# Patient Record
Sex: MALE | Race: WHITE
[De-identification: names, ages, dates, MRNs, and addresses within clinical notes are randomized per-mention and may not be internally consistent; named-entity substitution may affect disease eponyms.]

---

## 2019-01-14 ENCOUNTER — HOSPITAL ENCOUNTER (EMERGENCY)
Dept: HOSPITAL 10 - FTE | Age: 59
Discharge: HOME | End: 2019-01-14
Payer: COMMERCIAL

## 2019-01-14 VITALS
WEIGHT: 139.11 LBS | HEIGHT: 69 IN | BODY MASS INDEX: 20.6 KG/M2 | HEIGHT: 69 IN | WEIGHT: 139.11 LBS | BODY MASS INDEX: 20.6 KG/M2

## 2019-01-14 VITALS — HEART RATE: 102 BPM | SYSTOLIC BLOOD PRESSURE: 143 MMHG | DIASTOLIC BLOOD PRESSURE: 85 MMHG | RESPIRATION RATE: 18 BRPM

## 2019-01-14 DIAGNOSIS — Z85.47: ICD-10-CM

## 2019-01-14 DIAGNOSIS — Z85.048: ICD-10-CM

## 2019-01-14 DIAGNOSIS — I10: ICD-10-CM

## 2019-01-14 DIAGNOSIS — Z87.891: ICD-10-CM

## 2019-01-14 DIAGNOSIS — F20.9: Primary | ICD-10-CM

## 2019-01-14 DIAGNOSIS — Z85.118: ICD-10-CM

## 2019-01-14 PROCEDURE — 96372 THER/PROPH/DIAG INJ SC/IM: CPT

## 2019-01-15 NOTE — ERD
ER Documentation


Chief Complaint


Chief Complaint





GENERALIZED BODY PAIN, NO KNKOWN INJ; ON/OFF FOR "AWHILE"





HPI


58-year-old male presents for generalized body pain times 1 week.  Patient does 


have a past medical history of testicular cancer, psych history, lung cancer.  


Patient states that he has groin pain.  He states that he has a "blown nut".  On


further questioning patient states that he has a history of left testicular 


cancer while he was in residential a long time ago.  Patient also states that he has 


lung cancer and had prior treatment with the [facility].  Patient is a poor 


historian.  No other complaints.  Denies suicidal or homicidal ideation.





ROS


All systems reviewed and are negative except as per history of present illness.





Medications


Home Meds


Active Scripts


Hydrocodone Bit-Acetaminophen* (Vicodin*) 5-300 Tab, 1 TAB PO Q6H PRN for PAIN, 


#10 TAB


   Prov:KEILY ANDERSON DO         1/14/19


Olanzapine* (Zyprexa*) 5 Mg Tablet, 5 MG PO DAILY for schizophrenia, #30 TAB


   Prov:KEILY ANDERSON DO         1/14/19


Cyclobenzaprine Hcl* (Cyclobenzaprine Hcl*) 10 Mg Tablet, 10 MG PO TID PRN for 


MUSCLE SPASMS, #30 TAB


   Prov:KEILY ANDERSON DO         1/14/19


Ibuprofen* (Motrin*) 600 Mg Tab, 600 MG PO Q6H PRN for PAIN, #30 TAB


   Prov:KEILY ANDERSON DO         1/14/19


Ibuprofen* (Ibuprofen*) 600 Mg Tablet, 600 MG PO Q6H PRN for PAIN, #30 TAB


   Prov:ANT WEEKS MD         7/21/16


Olanzapine* (Zyprexa* Zydis) 5 Mg Tab, 5 MG PO DAILY, #14 TAB


   Prov:ANT WEEKS MD         7/21/16


Olanzapine* (Zyprexa*) 5 Mg Tablet, 5 MG PO DAILY, #6 TAB


   Prov:JADEN ESPANA DO         7/11/16


Risperidone* (Risperdal*) 1 Mg Tablet, 1 MG PO BID, #30 TAB


   Prov:BRONWYN TAYLOR         1/5/15





Allergies


Allergies:  


Coded Allergies:  


     pseudoephedrine (Verified  Allergy, Severe, 7/21/16)





PMhx/Soc


History of Surgery:  Yes (PATIENT STATED MANY SURGERY BUT UNABLE TO SAY )


Anesthesia Reaction:  No


Hx Neurological Disorder:  No


Hx Respiratory Disorders:  Yes (LUNG CA, BRONCHITIS, emphysema)


Hx Cardiac Disorders:  Yes (HTN)


Hx Psychiatric Problems:  Yes


Hx Miscellaneous Medical Probl:  Yes (rectal/testicular/lung CA)


Hx Alcohol Use:  Yes


Hx Substance Use:  Yes (MARIJUANA)


Hx Tobacco Use:  Yes


Smoking Status:  Never smoker





Physical Exam


Vitals


Vital Signs


  Date      Temp  Pulse  Resp  B/P (MAP)   Pulse Ox  O2          O2 Flow    FiO2


Time                                                 Delivery    Rate


   1/14/19  98.4    102    18      143/85        96  Room Air


     23:04                          (104)


   1/14/19  97.1    107    18      137/74        99


     19:11                           (95)





Physical Exam


Const:   No acute distress, patient is a poor historian.


Resp:   Clear to auscultation bilaterally


Cardio:   Regular rate and rhythm, no murmurs


Abd:    Soft, non tender, non distended. Normal bowel sounds


Skin:   No petechiae or rashes


Back:   No midline or flank tenderness


Ext:    No cyanosis, or edema


Neur:   Awake and alert


Psych:    Normal Mood and Affect


Patient refused genital exam.


Results 24 hrs





Current Medications


 Medications
   Dose
          Sig/Jose Francisco
       Start Time
   Status  Last


 (Trade)       Ordered        Route
 PRN     Stop Time              Admin
Dose


                              Reason                                Admin


 Ketorolac
     30 mg          ONCE  STAT
    1/14/19       DC           1/14/19


Tromethamine
                 IM
            21:23
                       21:36



 (Toradol)                                   1/14/19 21:24


                10 mg          ONCE  ONCE
    1/14/19       DC           1/14/19


Cyclobenzapri                 PO
            21:30
                       21:36



ne
 HCl
                                     1/14/19 21:31


(Flexeril)








Procedures/MDM


Medical Decision Making:





Differential diagnosis includes but not limited to exacerbation of psych 


disorder, electrolyte abnormalities, testicular cancer, lung cancer.





Patient appeared well on physical exam.  Patient was however a poor historian.


Discussed with patient that an extensive workup may be needed given his reported


history of testicular cancer, lung cancer, groin pain.


Patient states that he did not want any blood work done or imaging done.  He 


only wanted medication for pain.





Patient given Flexeril and Toradol in the ER.


Patient symptoms did improve.





Patient was given prescription for Norco short course low-dose, Flexeril, 


Zyprexa refill.





At this point patient's generalized body pain is nonspecific.


Given that patient refused lab work and imaging, patient encouraged to follow-up


with his primary care physician.





Patient advised to follow up with PCP in 1-2 days. Patient advised to return to 


ED for new or worsening symptoms. Patient stable on discharge from the ED.





The patient has been prescribed Norco during this encounter.





The patient has been warned about the use of narcotics. The patient should not 


drive or operate heavy machinery while taking this medication. The patient was 


also warned about the addictive properties of narcotic medications. 





[Narcan prescription was NOT provided given the following criteria





1. No more than 5 tablets of Norco 10 mg or 10 tablets of Norco 5 mg were pres


cribed.





2. Concomitant opiate and benzodiazepine prescriptions were not provided.





3. There is no obvious evidence of prior history of opiate abuse or overdose.]











Disclaimer: Inadvertent spelling and grammatical errors are likely due to 


EHR/dictation software use and do not reflect on the overall quality of patient 


care. Also, please note that the electronic time recorded on this note does not 


necessarily reflect the actual time of the patient encounter.





Departure


Diagnosis:  


   Primary Impression:  


   Generalized pain


   Additional Impression:  


   Schizophrenia


   Schizophrenia type:  unspecified  Qualified Codes:  F20.9 - Schizophrenia, 


   unspecified


Condition:  Fair


Patient Instructions:  Measuring Your Pain


Referrals:  


Critical access hospital CLINICS


YOU HAVE RECEIVED A MEDICAL SCREENING EXAM AND THE RESULTS INDICATE THAT YOU DO 


NOT HAVE A CONDITION THAT REQUIRES URGENT TREATMENT IN THE EMERGENCY DEPARTMENT.





FURTHER EVALUATION AND TREATMENT OF YOUR CONDITION CAN WAIT UNTIL YOU ARE SEEN 


IN YOUR DOCTORS OFFICE WITHIN THE NEXT 1-2 DAYS. IT IS YOUR RESPONSIBILITY TO 


MAKE AN APPOINTMENT FOR FOLOW-UP CARE.





IF YOU HAVE A PRIMARY DOCTOR


--you should call your primary doctor and schedule an appointment





IF YOU DO NOT HAVE A PRIMARY DOCTOR YOU CAN CALL OUR PHYSICIAN REFERRAL HOTLINE 


AT


 [phone] 





IF YOU CAN NOT AFFORD TO SEE A PHYSICIAN YOU CAN CHOSE FROM THE FOLLOWING 


Critical access hospital CLINICS





[facility] [phone] [address]. [facility] [phone] [address] [facility]. [facility] [phone] [address] [facility]. [facility] [phone] [address] [facility]. [facility] [phone] [address] [facility]. [facility]. [phone]


[address]





Additional Instructions:  


Call your primary care doctor TOMORROW for an appointment during the next 1-2 d


ays.See the doctor sooner or return here if your condition worsens before your 


appointment time.











KEILY ANDERSON DO                 Jassi 15, 2019 07:01

## 2019-02-02 ENCOUNTER — HOSPITAL ENCOUNTER (EMERGENCY)
Dept: HOSPITAL 87 - ER | Age: 59
LOS: 1 days | Discharge: HOME | End: 2019-02-03
Payer: MEDICAID

## 2019-02-02 VITALS — WEIGHT: 169.76 LBS | BODY MASS INDEX: 25.14 KG/M2 | HEIGHT: 69 IN
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
## 2019-02-02 DIAGNOSIS — N50.819: ICD-10-CM

## 2019-02-02 DIAGNOSIS — S90.921A: ICD-10-CM

## 2019-02-02 DIAGNOSIS — Z88.8: ICD-10-CM
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
## 2019-02-02 DIAGNOSIS — S90.922A: Primary | ICD-10-CM
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
## 2019-02-02 DIAGNOSIS — F12.10: ICD-10-CM
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
## 2019-02-02 DIAGNOSIS — F17.200: ICD-10-CM

## 2019-02-02 DIAGNOSIS — Z90.89: ICD-10-CM

## 2019-02-02 LAB
CHLORIDE SERPL-SCNC: 104 MEQ/L (ref 98–107)
INR PPP: 1
PROTHROMBIN TIME: 9.8 SEC (ref 9.1–11.1)

## 2019-02-02 PROCEDURE — 85025 COMPLETE CBC W/AUTO DIFF WBC: CPT

## 2019-02-02 PROCEDURE — 96374 THER/PROPH/DIAG INJ IV PUSH: CPT

## 2019-02-02 PROCEDURE — 81003 URINALYSIS AUTO W/O SCOPE: CPT

## 2019-02-02 PROCEDURE — 80053 COMPREHEN METABOLIC PANEL: CPT

## 2019-02-02 PROCEDURE — 36415 COLL VENOUS BLD VENIPUNCTURE: CPT

## 2019-02-02 PROCEDURE — 73630 X-RAY EXAM OF FOOT: CPT

## 2019-02-02 PROCEDURE — 85610 PROTHROMBIN TIME: CPT

## 2019-02-02 PROCEDURE — 93976 VASCULAR STUDY: CPT

## 2019-02-02 PROCEDURE — 99284 EMERGENCY DEPT VISIT MOD MDM: CPT

## 2019-02-02 PROCEDURE — 76870 US EXAM SCROTUM: CPT

## 2019-02-03 VITALS — DIASTOLIC BLOOD PRESSURE: 85 MMHG | SYSTOLIC BLOOD PRESSURE: 129 MMHG

## 2019-02-03 LAB
APPEARANCE UR: CLEAR
BASOPHILS NFR BLD AUTO: 0.7 % (ref 0–2)
COLOR UR: YELLOW
EOSINOPHIL NFR BLD AUTO: 5.1 % (ref 0–5)
ERYTHROCYTE [DISTWIDTH] IN BLOOD BY AUTOMATED COUNT: 15.4 % (ref 11.6–14.6)
HCT VFR BLD AUTO: 43.4 % (ref 42–52)
HGB BLD-MCNC: 14.4 G/DL (ref 14–18)
HGB UR QL STRIP: NEGATIVE
KETONES UR STRIP-MCNC: NEGATIVE MG/DL
LEUKOCYTE ESTERASE UR QL STRIP: NEGATIVE
LYMPHOCYTES NFR BLD AUTO: 29.1 % (ref 20–50)
MCH RBC QN AUTO: 33.1 PG (ref 28–32)
MCV RBC AUTO: 99.6 FL (ref 80–94)
MONOCYTES NFR BLD AUTO: 12.2 % (ref 2–8)
NEUTROPHILS NFR BLD AUTO: 52.9 % (ref 40–76)
NITRITE UR QL STRIP: NEGATIVE
PH UR STRIP: 6.5 [PH] (ref 4.5–8)
PLATELET # BLD AUTO: 340 X1000/UL (ref 130–400)
PMV BLD AUTO: 6.9 FL (ref 7.4–10.4)
PROT UR QL STRIP: NEGATIVE
RBC # BLD AUTO: 4.36 MILL/UL (ref 4.7–6.1)
SP GR UR STRIP: 1.01 (ref 1–1.03)
UROBILINOGEN UR STRIP-MCNC: 0.2 E.U./DL (ref 0.2–1)

## 2021-12-28 ENCOUNTER — HOSPITAL ENCOUNTER (INPATIENT)
Dept: HOSPITAL 12 - ER | Age: 61
LOS: 3 days | Discharge: NURSING, REHAB OR LONG-TERM CARE | DRG: 252 | End: 2021-12-31
Attending: INTERNAL MEDICINE | Admitting: INTERNAL MEDICINE
Payer: COMMERCIAL

## 2021-12-28 VITALS — SYSTOLIC BLOOD PRESSURE: 99 MMHG | DIASTOLIC BLOOD PRESSURE: 71 MMHG

## 2021-12-28 VITALS — WEIGHT: 130 LBS | HEIGHT: 68 IN | BODY MASS INDEX: 19.7 KG/M2

## 2021-12-28 DIAGNOSIS — G93.49: ICD-10-CM

## 2021-12-28 DIAGNOSIS — K56.41: ICD-10-CM

## 2021-12-28 DIAGNOSIS — R91.8: ICD-10-CM

## 2021-12-28 DIAGNOSIS — R13.10: ICD-10-CM

## 2021-12-28 DIAGNOSIS — K94.23: Primary | ICD-10-CM

## 2021-12-28 DIAGNOSIS — E53.8: ICD-10-CM

## 2021-12-28 DIAGNOSIS — Z87.891: ICD-10-CM

## 2021-12-28 DIAGNOSIS — N40.0: ICD-10-CM

## 2021-12-28 DIAGNOSIS — E78.5: ICD-10-CM

## 2021-12-28 DIAGNOSIS — K94.22: ICD-10-CM
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
## 2021-12-28 DIAGNOSIS — K59.00: ICD-10-CM

## 2021-12-28 DIAGNOSIS — K76.89: ICD-10-CM

## 2021-12-28 DIAGNOSIS — D75.89: ICD-10-CM

## 2021-12-28 DIAGNOSIS — Z86.16: ICD-10-CM

## 2021-12-28 DIAGNOSIS — K80.20: ICD-10-CM
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
## 2021-12-28 DIAGNOSIS — F03.90: ICD-10-CM

## 2021-12-28 DIAGNOSIS — Z85.47: ICD-10-CM

## 2021-12-28 DIAGNOSIS — L03.311: ICD-10-CM

## 2021-12-28 DIAGNOSIS — Z20.822: ICD-10-CM

## 2021-12-28 DIAGNOSIS — M51.36: ICD-10-CM

## 2021-12-28 DIAGNOSIS — F25.9: ICD-10-CM

## 2021-12-28 DIAGNOSIS — G62.9: ICD-10-CM

## 2021-12-28 LAB
ALP SERPL-CCNC: 62 U/L (ref 50–136)
ALT SERPL W/O P-5'-P-CCNC: 32 U/L (ref 16–63)
AST SERPL-CCNC: 41 U/L (ref 15–37)
BILIRUB DIRECT SERPL-MCNC: 0.1 MG/DL (ref 0–0.2)
BILIRUB SERPL-MCNC: 0.8 MG/DL (ref 0.2–1)
BUN SERPL-MCNC: 17 MG/DL (ref 7–18)
CHLORIDE SERPL-SCNC: 105 MMOL/L (ref 98–107)
CO2 SERPL-SCNC: 25 MMOL/L (ref 21–32)
CREAT SERPL-MCNC: 0.7 MG/DL (ref 0.6–1.3)
GLUCOSE SERPL-MCNC: 100 MG/DL (ref 74–106)
HCT VFR BLD AUTO: 48.3 % (ref 36.7–47.1)
LIPASE SERPL-CCNC: 127 U/L (ref 73–393)
MCH RBC QN AUTO: 34 UUG (ref 23.8–33.4)
MCV RBC AUTO: 97.5 FL (ref 73–96.2)
PLATELET # BLD AUTO: 186 K/UL (ref 152–348)
POTASSIUM SERPL-SCNC: 4.8 MMOL/L (ref 3.5–5.1)
WS STN SPEC: 8.1 G/DL (ref 6.4–8.2)

## 2021-12-28 PROCEDURE — G0378 HOSPITAL OBSERVATION PER HR: HCPCS

## 2021-12-28 PROCEDURE — A4663 DIALYSIS BLOOD PRESSURE CUFF: HCPCS

## 2021-12-28 PROCEDURE — 05H933Z INSERTION OF INFUSION DEVICE INTO RIGHT BRACHIAL VEIN, PERCUTANEOUS APPROACH: ICD-10-PCS

## 2021-12-28 RX ADMIN — SENNOSIDES SCH TAB: 8.6 TABLET, COATED ORAL at 21:00

## 2021-12-28 NOTE — NUR
Pt trans to room 314, NAD noted. Pt to have CT chest/abd/pelvis with IV 
contrast after midline placement, reported to BRIAN Roach accordingly.

## 2021-12-28 NOTE — NUR
Per CT tech pt's saline lock got infiltrated during CT. Unable to establish 
another saline lock at this time,  notified.

## 2021-12-28 NOTE — NUR
Per  pt to be admitted to m/s, [facility] paged, no m/s beds available at 
this time per m/s floor.

## 2021-12-29 VITALS — DIASTOLIC BLOOD PRESSURE: 75 MMHG | SYSTOLIC BLOOD PRESSURE: 107 MMHG

## 2021-12-29 VITALS — SYSTOLIC BLOOD PRESSURE: 106 MMHG | DIASTOLIC BLOOD PRESSURE: 64 MMHG

## 2021-12-29 VITALS — DIASTOLIC BLOOD PRESSURE: 69 MMHG | SYSTOLIC BLOOD PRESSURE: 113 MMHG

## 2021-12-29 VITALS — SYSTOLIC BLOOD PRESSURE: 110 MMHG | DIASTOLIC BLOOD PRESSURE: 62 MMHG

## 2021-12-29 LAB
ALP SERPL-CCNC: 62 U/L (ref 50–136)
ALT SERPL W/O P-5'-P-CCNC: 38 U/L (ref 16–63)
AST SERPL-CCNC: 18 U/L (ref 15–37)
BILIRUB SERPL-MCNC: 0.8 MG/DL (ref 0.2–1)
BUN SERPL-MCNC: 17 MG/DL (ref 7–18)
CHLORIDE SERPL-SCNC: 105 MMOL/L (ref 98–107)
CHOLEST SERPL-MCNC: 154 MG/DL (ref ?–200)
CO2 SERPL-SCNC: 24 MMOL/L (ref 21–32)
CREAT SERPL-MCNC: 0.7 MG/DL (ref 0.6–1.3)
GLUCOSE SERPL-MCNC: 88 MG/DL (ref 74–106)
HCT VFR BLD AUTO: 45.9 % (ref 36.7–47.1)
HDLC SERPL-MCNC: 39 MG/DL (ref 40–60)
MAGNESIUM SERPL-MCNC: 2.2 MG/DL (ref 1.8–2.4)
MCH RBC QN AUTO: 33.2 UUG (ref 23.8–33.4)
MCV RBC AUTO: 96.6 FL (ref 73–96.2)
PHOSPHATE SERPL-MCNC: 3.9 MG/DL (ref 2.5–4.9)
PLATELET # BLD AUTO: 293 K/UL (ref 152–348)
POTASSIUM SERPL-SCNC: 4 MMOL/L (ref 3.5–5.1)
TRIGL SERPL-MCNC: 67 MG/DL (ref 30–150)
TSH SERPL DL<=0.005 MIU/L-ACNC: 2.9 MIU/ML (ref 0.36–3.74)
VALPROATE SERPL-MCNC: < 3 UG/ML (ref 50–100)
WS STN SPEC: 7.7 G/DL (ref 6.4–8.2)

## 2021-12-29 RX ADMIN — VALPROIC ACID SCH MG: 250 SOLUTION ORAL at 12:26

## 2021-12-29 RX ADMIN — DOCUSATE SODIUM SCH MG: 50 LIQUID ORAL at 16:34

## 2021-12-29 RX ADMIN — THERA TABS SCH UDTAB: TAB at 09:43

## 2021-12-29 RX ADMIN — GABAPENTIN SCH MG: 300 CAPSULE ORAL at 16:35

## 2021-12-29 RX ADMIN — GABAPENTIN SCH MG: 300 CAPSULE ORAL at 12:26

## 2021-12-29 RX ADMIN — GABAPENTIN SCH MG: 300 CAPSULE ORAL at 09:43

## 2021-12-29 RX ADMIN — VALPROIC ACID SCH MG: 250 SOLUTION ORAL at 16:34

## 2021-12-29 RX ADMIN — SENNOSIDES SCH TAB: 8.6 TABLET, COATED ORAL at 21:48

## 2021-12-29 RX ADMIN — FINASTERIDE SCH MG: 5 TABLET, FILM COATED ORAL at 09:43

## 2021-12-29 NOTE — NUR
Admitted from [facility] for G Tube Malfunction. He was received in bed, alert and 
oriented to name only. He was to have an abdominal CT, but was waiting for a midline to be 
placed. This was placed around midnight, to the JOSE, 18 gauge. Radiology was busy, so they 
agreed to do the CT at around 8 AM, this morning. He was given pudding and applesauce, but 
PO medications were held, pending a swallow eval, this morning. HE has been NPO, since 1 AM. 
Currently awake. Remains calm and cooperative.

## 2021-12-29 NOTE — NUR
patient is alert to self, forgetful, no sob, resp even nonlabored,skin warm and dry to 
touch, g-tube intact, noted redness at g-tube site, dr rodarte is aware, waiting for wound 
consult for further instructions, no discharge noted at g-tube site, also noted with open 
sore to left mid abdomen, tx initiated, continue to monitor, patient refuses to eat, 
convinced to eat, patient only drank water and juice.

## 2021-12-30 VITALS — SYSTOLIC BLOOD PRESSURE: 102 MMHG | DIASTOLIC BLOOD PRESSURE: 67 MMHG

## 2021-12-30 VITALS — DIASTOLIC BLOOD PRESSURE: 62 MMHG | SYSTOLIC BLOOD PRESSURE: 110 MMHG

## 2021-12-30 VITALS — DIASTOLIC BLOOD PRESSURE: 60 MMHG | SYSTOLIC BLOOD PRESSURE: 99 MMHG

## 2021-12-30 VITALS — DIASTOLIC BLOOD PRESSURE: 71 MMHG | SYSTOLIC BLOOD PRESSURE: 113 MMHG

## 2021-12-30 RX ADMIN — VALPROIC ACID SCH MG: 250 SOLUTION ORAL at 16:12

## 2021-12-30 RX ADMIN — GABAPENTIN SCH MG: 300 CAPSULE ORAL at 12:27

## 2021-12-30 RX ADMIN — THERA TABS SCH UDTAB: TAB at 08:06

## 2021-12-30 RX ADMIN — VALPROIC ACID SCH MG: 250 SOLUTION ORAL at 12:27

## 2021-12-30 RX ADMIN — PANTOPRAZOLE SODIUM SCH MG: 40 GRANULE, DELAYED RELEASE ORAL at 06:29

## 2021-12-30 RX ADMIN — SENNOSIDES SCH TAB: 8.6 TABLET, COATED ORAL at 20:36

## 2021-12-30 RX ADMIN — VALPROIC ACID SCH MG: 250 SOLUTION ORAL at 08:06

## 2021-12-30 RX ADMIN — DOCUSATE SODIUM SCH MG: 50 LIQUID ORAL at 16:12

## 2021-12-30 RX ADMIN — GABAPENTIN SCH MG: 300 CAPSULE ORAL at 16:12

## 2021-12-30 RX ADMIN — GABAPENTIN SCH MG: 300 CAPSULE ORAL at 08:06

## 2021-12-30 RX ADMIN — DOCUSATE SODIUM SCH MG: 50 LIQUID ORAL at 08:06

## 2021-12-30 RX ADMIN — FINASTERIDE SCH MG: 5 TABLET, FILM COATED ORAL at 08:06

## 2021-12-30 NOTE — NUR
Received patient with HOB up. AAO to person, place and situation with some forgetfulness on 
time. Denies any pain at this time. On RA, no SOB noted. GT is intact and patent, placement 
checked by auscultation. He has 10 ml of residual. Midline to right upper arm is intact and 
patent. Offered PO intake but refused at this time. Safety measures initiated. Call light 
with in reach.

## 2021-12-30 NOTE — NUR
Pt slept throughout the night. Pt encouraged to eat, but refuses. Only will drink water. 
Tolerates drinking well with no coughing noted. IV site intact. Safety and comfort provided. 
Will endorse to day shift.

## 2021-12-30 NOTE — NUR
received call from radiology recommending for a repeat KUB to ensure that contrast has 
passed through the small bowel, relayed to MD. new order for KUB in 2 hours.

## 2021-12-31 VITALS — DIASTOLIC BLOOD PRESSURE: 62 MMHG | SYSTOLIC BLOOD PRESSURE: 93 MMHG

## 2021-12-31 VITALS — DIASTOLIC BLOOD PRESSURE: 82 MMHG | SYSTOLIC BLOOD PRESSURE: 111 MMHG

## 2021-12-31 RX ADMIN — DOCUSATE SODIUM SCH MG: 50 LIQUID ORAL at 08:37

## 2021-12-31 RX ADMIN — DOCUSATE SODIUM SCH MG: 50 LIQUID ORAL at 18:28

## 2021-12-31 RX ADMIN — VALPROIC ACID SCH MG: 250 SOLUTION ORAL at 08:38

## 2021-12-31 RX ADMIN — VALPROIC ACID SCH MG: 250 SOLUTION ORAL at 13:20

## 2021-12-31 RX ADMIN — GABAPENTIN SCH MG: 300 CAPSULE ORAL at 13:20

## 2021-12-31 RX ADMIN — PANTOPRAZOLE SODIUM SCH MG: 40 GRANULE, DELAYED RELEASE ORAL at 06:19

## 2021-12-31 RX ADMIN — FINASTERIDE SCH MG: 5 TABLET, FILM COATED ORAL at 08:38

## 2021-12-31 RX ADMIN — GABAPENTIN SCH MG: 300 CAPSULE ORAL at 18:28

## 2021-12-31 RX ADMIN — GABAPENTIN SCH MG: 300 CAPSULE ORAL at 08:38

## 2021-12-31 RX ADMIN — VALPROIC ACID SCH MG: 250 SOLUTION ORAL at 18:28

## 2021-12-31 RX ADMIN — THERA TABS SCH UDTAB: TAB at 08:38

## 2021-12-31 NOTE — NUR
Pt discharged to [facility] assisted living via ambulance.  AO x 2. On room air 
saturating at 99%. Nonverbal but is able to nod for needs. /72, temp 98.2, HR 91. Pt 
compliant with medication and care. Large formed bowel movement made. No acute distress. 
Discharge packet completed.

## 2021-12-31 NOTE — NUR
Patient slept intermittently.  Denies any pain at this time. On RA, no SOB noted. GT is 
intact and patent, placement assessed by auscultation. Midline to right upper arm is intact 
and patent. No significant events this shift. Safety measures continued. Call light with in 
reach.